# Patient Record
(demographics unavailable — no encounter records)

---

## 2025-05-14 NOTE — ASSESSMENT
[FreeTextEntry1] : 78-year-old with persistent bladder symptoms urethral symptoms from previous UTI.  Straight cath urine dip with moderate blood negative for nitrites and leukocyte Estrace.  Will send for formal urine analysis and urine culture Pyridium 100 mg given in the office and Rx sent Avoid acidic foods, consider alkaline water, hold cranberry juice tablets, iced tea until symptoms improve Vaginal atrophy will be treated with estradiol 1 g 3 times per week If no change in 6 weeks recommend cystoscopy   More than 50% of the encounter was spent counseling the patient on differential, workup, disease course and treatment/management. Education was provided to the patient during this encounter. All questions and concerns were addressed and answered. The patient verbalized understanding and agreed to the plan.

## 2025-05-14 NOTE — PHYSICAL EXAM
[Normal Appearance] : normal appearance [Well Groomed] : well groomed [General Appearance - In No Acute Distress] : no acute distress [Edema] : no peripheral edema [Respiration, Rhythm And Depth] : normal respiratory rhythm and effort [Exaggerated Use Of Accessory Muscles For Inspiration] : no accessory muscle use [Abdomen Soft] : soft [Abdomen Tenderness] : non-tender [Costovertebral Angle Tenderness] : no ~M costovertebral angle tenderness [Urinary Bladder Findings] : the bladder was normal on palpation [Normal Station and Gait] : the gait and station were normal for the patient's age [] : no rash [No Focal Deficits] : no focal deficits [Oriented To Time, Place, And Person] : oriented to person, place, and time [Affect] : the affect was normal [Mood] : the mood was normal [No Palpable Adenopathy] : no palpable adenopathy [de-identified] : Vaginal atrophy good anterior vaginal wall support normal meatus [Chaperoned Physical Exam] : A chaperone was present in the examining room during all aspects of the physical examination. [FreeTextEntry2] : Feli Etienne

## 2025-05-14 NOTE — HISTORY OF PRESENT ILLNESS
[FreeTextEntry1] : 78-year-old female presents for evaluation of persistent dysuria post UTI in December from a subacute rehab after left hip fracture with pinning.  Patient resides in [place].  Known to me for anterior bladder wall repair with mesh in 2014 has been doing well from that point. Denies any hematuria Unsure of the positive urine culture.   Renal bladder ultrasound from 2024 report reviewed history of renal cysts.